# Patient Record
Sex: MALE
[De-identification: names, ages, dates, MRNs, and addresses within clinical notes are randomized per-mention and may not be internally consistent; named-entity substitution may affect disease eponyms.]

---

## 2019-07-06 ENCOUNTER — TRANSCRIPTION ENCOUNTER (OUTPATIENT)
Age: 29
End: 2019-07-06

## 2021-10-12 ENCOUNTER — APPOINTMENT (OUTPATIENT)
Dept: HUMAN REPRODUCTION | Facility: CLINIC | Age: 31
End: 2021-10-12

## 2021-12-14 ENCOUNTER — TRANSCRIPTION ENCOUNTER (OUTPATIENT)
Age: 31
End: 2021-12-14

## 2021-12-15 ENCOUNTER — RESULT REVIEW (OUTPATIENT)
Age: 31
End: 2021-12-15

## 2021-12-15 ENCOUNTER — INPATIENT (INPATIENT)
Facility: HOSPITAL | Age: 31
LOS: 0 days | Discharge: HOME | DRG: 343 | End: 2021-12-16
Attending: SURGERY | Admitting: SURGERY
Payer: COMMERCIAL

## 2021-12-15 VITALS
WEIGHT: 195.11 LBS | OXYGEN SATURATION: 99 % | TEMPERATURE: 99 F | HEIGHT: 67 IN | HEART RATE: 87 BPM | SYSTOLIC BLOOD PRESSURE: 118 MMHG | DIASTOLIC BLOOD PRESSURE: 80 MMHG | RESPIRATION RATE: 19 BRPM

## 2021-12-15 DIAGNOSIS — K35.80 UNSPECIFIED ACUTE APPENDICITIS: ICD-10-CM

## 2021-12-15 LAB
ALBUMIN SERPL ELPH-MCNC: 4.8 G/DL — SIGNIFICANT CHANGE UP (ref 3.3–5)
ALP SERPL-CCNC: 64 U/L — SIGNIFICANT CHANGE UP (ref 40–120)
ALT FLD-CCNC: 13 U/L — SIGNIFICANT CHANGE UP (ref 10–45)
ANION GAP SERPL CALC-SCNC: 15 MMOL/L — SIGNIFICANT CHANGE UP (ref 5–17)
APTT BLD: 28.8 SEC — SIGNIFICANT CHANGE UP (ref 27.5–35.5)
AST SERPL-CCNC: 10 U/L — SIGNIFICANT CHANGE UP (ref 10–40)
BASOPHILS # BLD AUTO: 0.04 K/UL — SIGNIFICANT CHANGE UP (ref 0–0.2)
BASOPHILS NFR BLD AUTO: 0.5 % — SIGNIFICANT CHANGE UP (ref 0–2)
BILIRUB SERPL-MCNC: 1.1 MG/DL — SIGNIFICANT CHANGE UP (ref 0.2–1.2)
BLD GP AB SCN SERPL QL: NEGATIVE — SIGNIFICANT CHANGE UP
BUN SERPL-MCNC: 18 MG/DL — SIGNIFICANT CHANGE UP (ref 7–23)
CALCIUM SERPL-MCNC: 9.8 MG/DL — SIGNIFICANT CHANGE UP (ref 8.4–10.5)
CHLORIDE SERPL-SCNC: 101 MMOL/L — SIGNIFICANT CHANGE UP (ref 96–108)
CO2 SERPL-SCNC: 21 MMOL/L — LOW (ref 22–31)
CREAT SERPL-MCNC: 1.15 MG/DL — SIGNIFICANT CHANGE UP (ref 0.5–1.3)
EOSINOPHIL # BLD AUTO: 0.08 K/UL — SIGNIFICANT CHANGE UP (ref 0–0.5)
EOSINOPHIL NFR BLD AUTO: 1.1 % — SIGNIFICANT CHANGE UP (ref 0–6)
GLUCOSE SERPL-MCNC: 93 MG/DL — SIGNIFICANT CHANGE UP (ref 70–99)
HCT VFR BLD CALC: 46.6 % — SIGNIFICANT CHANGE UP (ref 39–50)
HGB BLD-MCNC: 16 G/DL — SIGNIFICANT CHANGE UP (ref 13–17)
IMM GRANULOCYTES NFR BLD AUTO: 0.3 % — SIGNIFICANT CHANGE UP (ref 0–1.5)
INR BLD: 1.12 RATIO — SIGNIFICANT CHANGE UP (ref 0.88–1.16)
LYMPHOCYTES # BLD AUTO: 2.16 K/UL — SIGNIFICANT CHANGE UP (ref 1–3.3)
LYMPHOCYTES # BLD AUTO: 29.3 % — SIGNIFICANT CHANGE UP (ref 13–44)
MCHC RBC-ENTMCNC: 30.7 PG — SIGNIFICANT CHANGE UP (ref 27–34)
MCHC RBC-ENTMCNC: 34.3 GM/DL — SIGNIFICANT CHANGE UP (ref 32–36)
MCV RBC AUTO: 89.4 FL — SIGNIFICANT CHANGE UP (ref 80–100)
MONOCYTES # BLD AUTO: 0.57 K/UL — SIGNIFICANT CHANGE UP (ref 0–0.9)
MONOCYTES NFR BLD AUTO: 7.7 % — SIGNIFICANT CHANGE UP (ref 2–14)
NEUTROPHILS # BLD AUTO: 4.5 K/UL — SIGNIFICANT CHANGE UP (ref 1.8–7.4)
NEUTROPHILS NFR BLD AUTO: 61.1 % — SIGNIFICANT CHANGE UP (ref 43–77)
NRBC # BLD: 0 /100 WBCS — SIGNIFICANT CHANGE UP (ref 0–0)
PLATELET # BLD AUTO: 193 K/UL — SIGNIFICANT CHANGE UP (ref 150–400)
POTASSIUM SERPL-MCNC: 4 MMOL/L — SIGNIFICANT CHANGE UP (ref 3.5–5.3)
POTASSIUM SERPL-SCNC: 4 MMOL/L — SIGNIFICANT CHANGE UP (ref 3.5–5.3)
PROT SERPL-MCNC: 7.9 G/DL — SIGNIFICANT CHANGE UP (ref 6–8.3)
PROTHROM AB SERPL-ACNC: 13.4 SEC — SIGNIFICANT CHANGE UP (ref 10.6–13.6)
RBC # BLD: 5.21 M/UL — SIGNIFICANT CHANGE UP (ref 4.2–5.8)
RBC # FLD: 11.6 % — SIGNIFICANT CHANGE UP (ref 10.3–14.5)
RH IG SCN BLD-IMP: POSITIVE — SIGNIFICANT CHANGE UP
RH IG SCN BLD-IMP: POSITIVE — SIGNIFICANT CHANGE UP
SARS-COV-2 RNA SPEC QL NAA+PROBE: SIGNIFICANT CHANGE UP
SODIUM SERPL-SCNC: 137 MMOL/L — SIGNIFICANT CHANGE UP (ref 135–145)
WBC # BLD: 7.37 K/UL — SIGNIFICANT CHANGE UP (ref 3.8–10.5)
WBC # FLD AUTO: 7.37 K/UL — SIGNIFICANT CHANGE UP (ref 3.8–10.5)

## 2021-12-15 PROCEDURE — 99285 EMERGENCY DEPT VISIT HI MDM: CPT | Mod: CS

## 2021-12-15 PROCEDURE — 88304 TISSUE EXAM BY PATHOLOGIST: CPT | Mod: 26

## 2021-12-15 PROCEDURE — 44970 LAPAROSCOPY APPENDECTOMY: CPT | Mod: GC

## 2021-12-15 PROCEDURE — 74177 CT ABD & PELVIS W/CONTRAST: CPT | Mod: 26,MA

## 2021-12-15 RX ORDER — ERTAPENEM SODIUM 1 G/1
1000 INJECTION, POWDER, LYOPHILIZED, FOR SOLUTION INTRAMUSCULAR; INTRAVENOUS EVERY 24 HOURS
Refills: 0 | Status: DISCONTINUED | OUTPATIENT
Start: 2021-12-15 | End: 2021-12-15

## 2021-12-15 RX ORDER — ACETAMINOPHEN 500 MG
975 TABLET ORAL ONCE
Refills: 0 | Status: COMPLETED | OUTPATIENT
Start: 2021-12-15 | End: 2021-12-15

## 2021-12-15 RX ORDER — ERTAPENEM SODIUM 1 G/1
1000 INJECTION, POWDER, LYOPHILIZED, FOR SOLUTION INTRAMUSCULAR; INTRAVENOUS ONCE
Refills: 0 | Status: COMPLETED | OUTPATIENT
Start: 2021-12-15 | End: 2021-12-15

## 2021-12-15 RX ORDER — ONDANSETRON 8 MG/1
4 TABLET, FILM COATED ORAL ONCE
Refills: 0 | Status: DISCONTINUED | OUTPATIENT
Start: 2021-12-16 | End: 2021-12-16

## 2021-12-15 RX ORDER — ENOXAPARIN SODIUM 100 MG/ML
40 INJECTION SUBCUTANEOUS EVERY 24 HOURS
Refills: 0 | Status: DISCONTINUED | OUTPATIENT
Start: 2021-12-15 | End: 2021-12-15

## 2021-12-15 RX ORDER — HYDROMORPHONE HYDROCHLORIDE 2 MG/ML
0.5 INJECTION INTRAMUSCULAR; INTRAVENOUS; SUBCUTANEOUS
Refills: 0 | Status: DISCONTINUED | OUTPATIENT
Start: 2021-12-16 | End: 2021-12-16

## 2021-12-15 RX ORDER — SODIUM CHLORIDE 9 MG/ML
1000 INJECTION, SOLUTION INTRAVENOUS
Refills: 0 | Status: DISCONTINUED | OUTPATIENT
Start: 2021-12-15 | End: 2021-12-15

## 2021-12-15 RX ORDER — HYDROMORPHONE HYDROCHLORIDE 2 MG/ML
1 INJECTION INTRAMUSCULAR; INTRAVENOUS; SUBCUTANEOUS
Refills: 0 | Status: DISCONTINUED | OUTPATIENT
Start: 2021-12-16 | End: 2021-12-16

## 2021-12-15 RX ADMIN — ERTAPENEM SODIUM 120 MILLIGRAM(S): 1 INJECTION, POWDER, LYOPHILIZED, FOR SOLUTION INTRAMUSCULAR; INTRAVENOUS at 21:07

## 2021-12-15 RX ADMIN — Medication 975 MILLIGRAM(S): at 19:25

## 2021-12-15 RX ADMIN — Medication 975 MILLIGRAM(S): at 21:07

## 2021-12-15 NOTE — ED PROVIDER NOTE - PHYSICAL EXAMINATION
T(C): 37.2 (15 Dec 2021 18:52), Max: 37.2 (15 Dec 2021 18:52)  T(F): 98.9 (15 Dec 2021 18:52), Max: 98.9 (15 Dec 2021 18:52)  HR: 87 (15 Dec 2021 18:52) (87 - 87)  BP: 118/80 (15 Dec 2021 18:52) (118/80 - 118/80)  BP(mean): --  ABP: --  ABP(mean): --  RR: 19 (15 Dec 2021 18:52) (19 - 19)  SpO2: 99% (15 Dec 2021 18:52) (99% - 99%)    GENERAL: Awake, alert, NAD  LUNGS: CTAB, no wheezes or crackles   CARDIAC: RRR, no m/r/g  ABDOMEN: Soft, ,RLQ tenderness w/ rebound, non distended   EXT: No edema, no calf tenderness, 2+ DP pulses bilaterally, no deformities.  NEURO: A&Ox3. Moving all extremities.  SKIN: Warm and dry. No rash.  PSYCH: Normal affect.

## 2021-12-15 NOTE — ED ADULT NURSE NOTE - OBJECTIVE STATEMENT
Pt is a 32 y/o male presenting to the ED c/o abdominal pain x3 days. Pt states pain in RLQ, with varying pain severity and pressure, currently 2/10. Pt states taking ibuprofen at home which helped. Pt is a 30 y/o male presenting to the ED c/o abdominal pain x3 days. Pt states pain in RLQ, with varying pain severity and pressure, currently 2/10. Pt states taking ibuprofen at home which helped, and episodes of nausea no vomiting. Pt denies PMH. Abdomen soft, non-distended. Pt is A&Ox3, follows commands, speaks coherently, sensory/motor function intact, VSS. Pt denies chest pain, SOB, vomiting, diarrhea, constipation, back pain, numbness/ tingling, dizziness, fever, chills at this time.

## 2021-12-15 NOTE — H&P ADULT - HISTORY OF PRESENT ILLNESS
Mr. Nath is a 31 Year-Old Gentleman with no past medical history or surgical history presenting with 2 days of RLQ abdominal pain, nausea, poor Per Os tolerance. No emesis. No fevers or chills. had elevated WBC as outpatient and was instructed to present to ED.

## 2021-12-15 NOTE — ED ADULT NURSE NOTE - NSIMPLEMENTINTERV_GEN_ALL_ED
Implemented All Universal Safety Interventions:  [place] to call system. Call bell, personal items and telephone within reach. Instruct patient to call for assistance. Room bathroom lighting operational. Non-slip footwear when patient is off stretcher. Physically safe environment: no spills, clutter or unnecessary equipment. Stretcher in lowest position, wheels locked, appropriate side rails in place.

## 2021-12-15 NOTE — H&P ADULT - NSHPLABSRESULTS_GEN_ALL_CORE
Labs:                       16.0   7.37  )-----------( 193      ( 15 Dec 2021 19:42 )             46.6   12-15    137  |  101  |  18  ----------------------------<  93  4.0   |  21<L>  |  1.15    Ca    9.8      15 Dec 2021 19:42    TPro  7.9  /  Alb  4.8  /  TBili  1.1  /  DBili  x   /  AST  10  /  ALT  13  /  AlkPhos  64  12-15    < from: CT Abdomen and Pelvis w/ IV Cont (12.15.21 @ 19:55) >    FINDINGS:  LOWER CHEST: Within normal limits.    LIVER: Within normal limits.  BILE DUCTS: Normal caliber.  GALLBLADDER: Within normal limits.  SPLEEN: Within normal limits.  PANCREAS: Within normal limits.  ADRENALS: Within normal limits.  KIDNEYS/URETERS: Within normal limits.    BLADDER: Within normal limits.  REPRODUCTIVE ORGANS: Prostate within normal limits.    BOWEL: No bowel obstruction. Colonic diverticulosis without   diverticulitis. Appendix measures up to 1.2 cm at the largest diameter   with thickened wall and trace periappendiceal fat stranding.  PERITONEUM: No ascites, drainable or rim-enhancing fluid collection to   suggest an abscess.  VESSELS: Within normal limits.  RETROPERITONEUM/LYMPH NODES: Prominent mesenteric lymph nodesin the   right hemiabdomen.  ABDOMINAL WALL: Within normal limits.  BONES: Within normal limits.    IMPRESSION:    Uncomplicated acute appendicitis.

## 2021-12-15 NOTE — ED PROVIDER NOTE - OBJECTIVE STATEMENT
31M no sig PMH CC abdominal pain x2 days, started out of blue, no trauma, has nausea, no vomit. Came in from  w/ wbc of 12k. RLQ pain travelling from RLQ to epigastrium region. Has taken advil for pain, pain in control at this time.    Denies any fevers, chest pain, SOB, vomiting,

## 2021-12-15 NOTE — ED PROVIDER NOTE - RAPID ASSESSMENT
31 M w/ no reported PMHx p/w RLQ abdominal pain x 2 days with associated nausea. Blood work at urgent care revealed elevated WBC in 12,000s, referred to hospital. Took Advil this morning with improvement. Pt denies fever, vomiting. NKDA.    Mild distress secondary to condition.    **Pt seen in the waiting room via teletriage by Donal Mccoy), documentation completed by Scribprince -Andie Garcia. Pt to be sent to main ED for further evaluation - all orders placed to be followed by MD in the main ED**    Scribe Statement: Jose EDMONDSON Tiffany, attest that this documentation has been prepared under the direction and in the presence of Donal Mccoy) 31 M w/ no reported PMHx p/w RLQ abdominal pain x 2 days with associated nausea. Blood work at urgent care revealed elevated WBC in 12,000s, referred to hospital. Took Advil this morning with improvement. Pt denies fever, vomiting. NKDA.    Mild distress secondary to condition.    **Pt seen in the waiting room via teletriage by Donal Mccoy), documentation completed by Scribprince -Andie Garcia. Pt to be sent to main ED for further evaluation - all orders placed to be followed by MD in the main ED**    Scribe Statement: IJose Tiffany, attest that this documentation has been prepared under the direction and in the presence of Donal Mccoy)    Donal FAITH. Nadine CRISOSTOMO note: The scribe's documentation has been prepared under my direction and personally reviewed by me.  Patient was seen as a tele QDOC patient, the patient will be seen and further worked up in the main emergency department and their care will be completed by the main emergency department team along with a thorough physical exam. Receiving team will follow up on labs, analgesia, any clinical imaging, reassess and disposition as clinically indicated, all decisions regarding the progression of care will be made at their discretion.

## 2021-12-15 NOTE — ED PROVIDER NOTE - GENITOURINARY NEGATIVE STATEMENT, MLM
Syed Mata   no dysuria, no frequency, and no hematuria.

## 2021-12-15 NOTE — H&P ADULT - ATTENDING COMMENTS
Patient seen and examined and agree with above.  31  year old male with no PMH or surgical history who presents with signs and symptoms of acute appendicitis. The states he had RLQ pain as well as umbilical pain.   Hemodynamically stable  On physical exam he is tender in the RLQ.   WBC 7   CT scan with uncomplicated acute appendicitis.  Admit to ACS  Patient consented for a laparoscopic appendectomy, possible open.   The risks and benefits have been explained.   NPO, IVF  Type and screen  I have reviewed PMH, PSH, labs, meds and imaging.

## 2021-12-15 NOTE — H&P ADULT - NSHPPHYSICALEXAM_GEN_ALL_CORE
General: No Acute Distress.  Neuro: Alert and oriented, no focal deficits, moves all extremities spontaneously.  HEENT: Extraocular movements intact. Mucosa moist.   Respiratory: Airway patent, respirations unlabored.  Cardiovascular: Pulse present.   Abdomen: Soft, tender in RLQ, no rebound or guarding, nondistended.  Genitourinary: No obvious lesions.  Extremities: Warm, moves all four.  Musculoskeletal: No tenderness of extremities, mobile joints.   Integumentary: No obvious lesions.

## 2021-12-15 NOTE — H&P ADULT - ASSESSMENT
Mr. Nath is a 31 Year-Old Gentleman presenting with 2 days of abdominal pain found to have acute appendicitis.     - Admit to Surgery, Dr. Mei.   - Booked, consented for Laparoscopic Appendectomy, possible open.   - Nil Per Os, Intravenous Fluid Resuscitation Therapy.   - Intravenous antibiotic administration: Ertapenem.     Please contact Trauma and Acute Care Surgery at #[phone] with any questions or concerns.

## 2021-12-16 VITALS
RESPIRATION RATE: 16 BRPM | SYSTOLIC BLOOD PRESSURE: 132 MMHG | DIASTOLIC BLOOD PRESSURE: 74 MMHG | OXYGEN SATURATION: 97 % | HEART RATE: 76 BPM | TEMPERATURE: 97 F

## 2021-12-16 PROCEDURE — C9399: CPT

## 2021-12-16 PROCEDURE — 85730 THROMBOPLASTIN TIME PARTIAL: CPT

## 2021-12-16 PROCEDURE — 96374 THER/PROPH/DIAG INJ IV PUSH: CPT

## 2021-12-16 PROCEDURE — 80053 COMPREHEN METABOLIC PANEL: CPT

## 2021-12-16 PROCEDURE — 99285 EMERGENCY DEPT VISIT HI MDM: CPT | Mod: 25

## 2021-12-16 PROCEDURE — 74177 CT ABD & PELVIS W/CONTRAST: CPT | Mod: MA

## 2021-12-16 PROCEDURE — 86901 BLOOD TYPING SEROLOGIC RH(D): CPT

## 2021-12-16 PROCEDURE — 87635 SARS-COV-2 COVID-19 AMP PRB: CPT

## 2021-12-16 PROCEDURE — 85025 COMPLETE CBC W/AUTO DIFF WBC: CPT

## 2021-12-16 PROCEDURE — 86850 RBC ANTIBODY SCREEN: CPT

## 2021-12-16 PROCEDURE — 36415 COLL VENOUS BLD VENIPUNCTURE: CPT

## 2021-12-16 PROCEDURE — 86900 BLOOD TYPING SEROLOGIC ABO: CPT

## 2021-12-16 PROCEDURE — 88304 TISSUE EXAM BY PATHOLOGIST: CPT

## 2021-12-16 PROCEDURE — 85610 PROTHROMBIN TIME: CPT

## 2021-12-16 PROCEDURE — C1889: CPT

## 2021-12-16 RX ORDER — SODIUM CHLORIDE 9 MG/ML
1000 INJECTION, SOLUTION INTRAVENOUS
Refills: 0 | Status: DISCONTINUED | OUTPATIENT
Start: 2021-12-16 | End: 2021-12-16

## 2021-12-16 RX ORDER — IBUPROFEN 200 MG
1 TABLET ORAL
Qty: 0 | Refills: 0 | DISCHARGE
Start: 2021-12-16

## 2021-12-16 RX ORDER — ACETAMINOPHEN 500 MG
650 TABLET ORAL EVERY 6 HOURS
Refills: 0 | Status: DISCONTINUED | OUTPATIENT
Start: 2021-12-16 | End: 2021-12-16

## 2021-12-16 RX ORDER — ACETAMINOPHEN 500 MG
2 TABLET ORAL
Qty: 0 | Refills: 0 | DISCHARGE
Start: 2021-12-16

## 2021-12-16 RX ORDER — IBUPROFEN 200 MG
400 TABLET ORAL EVERY 8 HOURS
Refills: 0 | Status: DISCONTINUED | OUTPATIENT
Start: 2021-12-16 | End: 2021-12-16

## 2021-12-16 RX ORDER — OXYCODONE HYDROCHLORIDE 5 MG/1
5 TABLET ORAL EVERY 6 HOURS
Refills: 0 | Status: DISCONTINUED | OUTPATIENT
Start: 2021-12-16 | End: 2021-12-16

## 2021-12-16 RX ORDER — OXYCODONE HYDROCHLORIDE 5 MG/1
1 TABLET ORAL
Qty: 5 | Refills: 0
Start: 2021-12-16

## 2021-12-16 RX ORDER — HEPARIN SODIUM 5000 [USP'U]/ML
5000 INJECTION INTRAVENOUS; SUBCUTANEOUS EVERY 8 HOURS
Refills: 0 | Status: DISCONTINUED | OUTPATIENT
Start: 2021-12-16 | End: 2021-12-16

## 2021-12-16 RX ADMIN — SODIUM CHLORIDE 75 MILLILITER(S): 9 INJECTION, SOLUTION INTRAVENOUS at 00:36

## 2021-12-16 RX ADMIN — HYDROMORPHONE HYDROCHLORIDE 0.5 MILLIGRAM(S): 2 INJECTION INTRAMUSCULAR; INTRAVENOUS; SUBCUTANEOUS at 01:50

## 2021-12-16 RX ADMIN — SODIUM CHLORIDE 75 MILLILITER(S): 9 INJECTION, SOLUTION INTRAVENOUS at 00:11

## 2021-12-16 RX ADMIN — HYDROMORPHONE HYDROCHLORIDE 0.5 MILLIGRAM(S): 2 INJECTION INTRAMUSCULAR; INTRAVENOUS; SUBCUTANEOUS at 02:05

## 2021-12-16 NOTE — ASU DISCHARGE PLAN (ADULT/PEDIATRIC) - ASU DC SPECIAL INSTRUCTIONSFT
Please remove dressing in 24 hours. Afterwards, you may shower. For pain, you may take tylenol 975mg every 6 hours as needed alternated with ibuprofen 200mg every 6 hours as needed. Additionally, you may take Oxycodone 5mg every 4 hours as needed for breakthrough pain. You may resume regular diet. Please call the office to schedule follow up in 1-2 weeks. Please follow up with your primary care provider in 1 week.

## 2021-12-16 NOTE — ASU DISCHARGE PLAN (ADULT/PEDIATRIC) - NS MD DC FALL RISK RISK
For information on Fall & Injury Prevention, visit: [email or web address] OR  [email or web address] OR  https://www.cdc.gov/steadi/patient.html

## 2021-12-16 NOTE — ASU DISCHARGE PLAN (ADULT/PEDIATRIC) - CARE PROVIDER_API CALL
Nan Mei (MD)  Surgery; Surgical Critical Care  [address], [address]  [place] [address]  Phone: [phone]  Fax: [phone]  Follow Up Time:

## 2021-12-20 PROBLEM — Z00.00 ENCOUNTER FOR PREVENTIVE HEALTH EXAMINATION: Status: ACTIVE | Noted: 2021-12-20

## 2021-12-23 LAB — SURGICAL PATHOLOGY STUDY: SIGNIFICANT CHANGE UP

## 2021-12-27 ENCOUNTER — APPOINTMENT (OUTPATIENT)
Dept: TRAUMA SURGERY | Facility: CLINIC | Age: 31
End: 2021-12-27
Payer: COMMERCIAL

## 2021-12-27 PROBLEM — Z00.00 ENCOUNTER FOR PREVENTIVE HEALTH EXAMINATION: Status: ACTIVE | Noted: 2021-12-27

## 2021-12-27 PROCEDURE — 99024 POSTOP FOLLOW-UP VISIT: CPT

## 2021-12-27 NOTE — HISTORY OF PRESENT ILLNESS
[FreeTextEntry1] : 31 year old male s/p laparoscopic appendectomy here for follow up. \par The patient denies pain and fever, chills. He is tolerating his diet. He is having normal bowel movements. \par His pathology was discussed with him which was acute appendicitis with periappendicitis. \par I discussed no heavy lifting for 6 weeks as he is a  and he is aware and has been careful not to lift anything heavy. \par

## 2021-12-27 NOTE — PHYSICAL EXAM
[de-identified] : well appearing male in no acute distress [de-identified] : normocephalic  [de-identified] : soft nontender, nondistended. NO erythema, drainage noted. \par Incisions are clean, dry and intact.

## 2022-04-06 NOTE — ED PROVIDER NOTE - DOMESTIC TRAVEL HIGH RISK QUESTION
Called [facility] [phone], spoke with Yvonne Corey, She states PA for Trulicity was approved up to 2ml  Per 28 days. From 4/5/2022-4/5/2023,  Anthony Collier #[id number]. Call ref # [phone] spoke with Valentín Lemus. Per Stevphen Lipps be refilled until 4/14, no PA required \"it's just too soon. \" No

## 2023-04-12 NOTE — ED PROVIDER NOTE - DATE/TIME 1
15-Dec-2021 20:58 PROCEDURES:  Repair, hernia, inguinal, open, using mesh, adult 12-Apr-2023 12:23:35  Azalia Estes
